# Patient Record
Sex: FEMALE | Race: BLACK OR AFRICAN AMERICAN | NOT HISPANIC OR LATINO | URBAN - METROPOLITAN AREA
[De-identification: names, ages, dates, MRNs, and addresses within clinical notes are randomized per-mention and may not be internally consistent; named-entity substitution may affect disease eponyms.]

---

## 2020-10-22 PROBLEM — Z12.11 SCREENING FOR COLONIC NEOPLASIA: Status: ACTIVE | Noted: 2020-10-22

## 2020-11-20 ENCOUNTER — OFFICE (OUTPATIENT)
Dept: URBAN - METROPOLITAN AREA CLINIC 34 | Facility: CLINIC | Age: 50
End: 2020-11-20
Payer: COMMERCIAL

## 2020-11-20 DIAGNOSIS — Z11.59 ENCOUNTER FOR SCREENING FOR OTHER VIRAL DISEASES: ICD-10-CM

## 2020-11-20 PROCEDURE — 99211 OFF/OP EST MAY X REQ PHY/QHP: CPT | Mod: 25,CS | Performed by: INTERNAL MEDICINE

## 2020-11-24 ENCOUNTER — OFFICE (OUTPATIENT)
Dept: URBAN - METROPOLITAN AREA CLINIC 30 | Facility: CLINIC | Age: 50
End: 2020-11-24
Payer: COMMERCIAL

## 2020-11-24 VITALS
RESPIRATION RATE: 18 BRPM | HEART RATE: 78 BPM | SYSTOLIC BLOOD PRESSURE: 101 MMHG | HEART RATE: 78 BPM | DIASTOLIC BLOOD PRESSURE: 35 MMHG | SYSTOLIC BLOOD PRESSURE: 138 MMHG | RESPIRATION RATE: 21 BRPM | OXYGEN SATURATION: 95 % | OXYGEN SATURATION: 99 % | WEIGHT: 230 LBS | WEIGHT: 230 LBS | DIASTOLIC BLOOD PRESSURE: 58 MMHG | HEART RATE: 101 BPM | SYSTOLIC BLOOD PRESSURE: 106 MMHG | SYSTOLIC BLOOD PRESSURE: 106 MMHG | SYSTOLIC BLOOD PRESSURE: 128 MMHG | HEART RATE: 81 BPM | SYSTOLIC BLOOD PRESSURE: 131 MMHG | TEMPERATURE: 98.1 F | OXYGEN SATURATION: 93 % | SYSTOLIC BLOOD PRESSURE: 97 MMHG | RESPIRATION RATE: 17 BRPM | HEART RATE: 79 BPM | RESPIRATION RATE: 18 BRPM | OXYGEN SATURATION: 93 % | SYSTOLIC BLOOD PRESSURE: 81 MMHG | SYSTOLIC BLOOD PRESSURE: 97 MMHG | RESPIRATION RATE: 16 BRPM | DIASTOLIC BLOOD PRESSURE: 69 MMHG | DIASTOLIC BLOOD PRESSURE: 35 MMHG | SYSTOLIC BLOOD PRESSURE: 128 MMHG | DIASTOLIC BLOOD PRESSURE: 80 MMHG | DIASTOLIC BLOOD PRESSURE: 74 MMHG | HEART RATE: 83 BPM | DIASTOLIC BLOOD PRESSURE: 80 MMHG | HEART RATE: 83 BPM | DIASTOLIC BLOOD PRESSURE: 69 MMHG | HEIGHT: 64 IN | DIASTOLIC BLOOD PRESSURE: 75 MMHG | DIASTOLIC BLOOD PRESSURE: 75 MMHG | RESPIRATION RATE: 20 BRPM | OXYGEN SATURATION: 99 % | RESPIRATION RATE: 21 BRPM | OXYGEN SATURATION: 95 % | HEART RATE: 101 BPM | HEIGHT: 64 IN | DIASTOLIC BLOOD PRESSURE: 86 MMHG | OXYGEN SATURATION: 96 % | SYSTOLIC BLOOD PRESSURE: 101 MMHG | SYSTOLIC BLOOD PRESSURE: 138 MMHG | SYSTOLIC BLOOD PRESSURE: 131 MMHG | TEMPERATURE: 98.1 F | DIASTOLIC BLOOD PRESSURE: 74 MMHG | HEART RATE: 81 BPM | DIASTOLIC BLOOD PRESSURE: 58 MMHG | RESPIRATION RATE: 20 BRPM | RESPIRATION RATE: 16 BRPM | DIASTOLIC BLOOD PRESSURE: 86 MMHG | HEART RATE: 79 BPM | OXYGEN SATURATION: 96 % | SYSTOLIC BLOOD PRESSURE: 81 MMHG | RESPIRATION RATE: 17 BRPM

## 2020-11-24 DIAGNOSIS — Z12.11 ENCOUNTER FOR SCREENING FOR MALIGNANT NEOPLASM OF COLON: ICD-10-CM

## 2020-11-24 DIAGNOSIS — K57.30 DIVERTICULOSIS OF LARGE INTESTINE WITHOUT PERFORATION OR ABS: ICD-10-CM

## 2020-11-24 DIAGNOSIS — K63.5 POLYP OF COLON: ICD-10-CM

## 2020-11-24 LAB
GI LOWER POLYPECTOMY EXCISION - SPECM 1 JAR(S): 1: (no result)
GI LOWER POLYPECTOMY EXCISION - SPECM 1 JAR(S): 1: (no result)
GI LOWER POLYPECTOMY EXCISION - SPECM 1 JAR(S): 1: PDF REPORT: (no result)
GI LOWER POLYPECTOMY EXCISION - SPECM 1 JAR(S): 1: PDF REPORT: (no result)

## 2020-11-24 PROCEDURE — 00812 ANES LWR INTST SCR COLSC: CPT | Mod: AA,P3,PT,QS

## 2020-11-24 PROCEDURE — 45380 COLONOSCOPY AND BIOPSY: CPT | Mod: PT | Performed by: INTERNAL MEDICINE

## 2020-11-24 PROCEDURE — 00812 ANES LWR INTST SCR COLSC: CPT | Mod: AA,QS,P3,PT

## 2021-12-28 ENCOUNTER — TELEHEALTH PROVIDED OTHER THAN IN PATIENT'S HOME (OUTPATIENT)
Dept: URBAN - METROPOLITAN AREA TELEHEALTH 12 | Facility: TELEHEALTH | Age: 51
End: 2021-12-28

## 2021-12-28 VITALS — WEIGHT: 230 LBS | HEIGHT: 64 IN

## 2021-12-28 DIAGNOSIS — K21.9 GASTRO-ESOPHAGEAL REFLUX DISEASE WITHOUT ESOPHAGITIS: ICD-10-CM

## 2021-12-28 PROCEDURE — 99214 OFFICE O/P EST MOD 30 MIN: CPT | Mod: 95 | Performed by: PHYSICIAN ASSISTANT

## 2021-12-28 NOTE — SERVICEHPINOTES
PATIENT VERIFIED BY DATE OF BIRTH AND NAME. Patient has been consented for this telecommunication visit.   Ms. Lan is here to discuss reflux present x 5 yrs. She takes Omeprazole which helps but not fully having to take adjunct medications which she takes on a frequent basis. Certain blood pressure medications have made her reflux worse over time. Had generalized abdominal pain present last week but this went away after switching her BP medication. No dysphagia, anorexia, early satiety, N/V, and no black stool. She takes daily Omeprazole and eats 30 minutes later. She knows what her trigger foods are, she tries to follow a low acid diet. No regular NSAID use.

## 2022-01-06 ENCOUNTER — OFFICE (OUTPATIENT)
Dept: URBAN - METROPOLITAN AREA CLINIC 30 | Facility: CLINIC | Age: 52
End: 2022-01-06

## 2022-01-06 VITALS
SYSTOLIC BLOOD PRESSURE: 135 MMHG | DIASTOLIC BLOOD PRESSURE: 127 MMHG | TEMPERATURE: 97.3 F | OXYGEN SATURATION: 100 % | OXYGEN SATURATION: 96 % | RESPIRATION RATE: 22 BRPM | HEART RATE: 76 BPM | SYSTOLIC BLOOD PRESSURE: 146 MMHG | HEIGHT: 63 IN | SYSTOLIC BLOOD PRESSURE: 152 MMHG | DIASTOLIC BLOOD PRESSURE: 93 MMHG | SYSTOLIC BLOOD PRESSURE: 126 MMHG | TEMPERATURE: 98.1 F | RESPIRATION RATE: 18 BRPM | SYSTOLIC BLOOD PRESSURE: 170 MMHG | HEART RATE: 72 BPM | OXYGEN SATURATION: 99 % | HEART RATE: 71 BPM | WEIGHT: 230 LBS | DIASTOLIC BLOOD PRESSURE: 84 MMHG | DIASTOLIC BLOOD PRESSURE: 83 MMHG | RESPIRATION RATE: 16 BRPM | HEART RATE: 87 BPM | DIASTOLIC BLOOD PRESSURE: 75 MMHG

## 2022-01-06 DIAGNOSIS — K22.89 OTHER SPECIFIED DISEASE OF ESOPHAGUS: ICD-10-CM

## 2022-01-06 DIAGNOSIS — K31.89 OTHER DISEASES OF STOMACH AND DUODENUM: ICD-10-CM

## 2022-01-06 DIAGNOSIS — K21.9 GASTRO-ESOPHAGEAL REFLUX DISEASE WITHOUT ESOPHAGITIS: ICD-10-CM

## 2022-01-06 DIAGNOSIS — K29.60 OTHER GASTRITIS WITHOUT BLEEDING: ICD-10-CM

## 2022-01-06 LAB
GI UPPER EGD HISOLOGY - SPECM 1: CLINICAL INFORMATION: (no result)
GI UPPER EGD HISOLOGY - SPECM 1: CPT CODES: (no result)
GI UPPER EGD HISOLOGY - SPECM 1: DIAGNOSIS: (no result)
GI UPPER EGD HISOLOGY - SPECM 1: GROSS DESCRIPTION: (no result)
GI UPPER EGD HISOLOGY - SPECM 1: INCOMING ICD CODE(S): (no result)
GI UPPER EGD HISOLOGY - SPECM 1: MICROSCOPIC DESCRIPTION: (no result)
GI UPPER EGD HISOLOGY - SPECM 1: PATHOLOGIST: (no result)
GI UPPER EGD HISOLOGY - SPECM 1: REPORT TITLE: (no result)
GI UPPER EGD HISOLOGY - SPECM 1: SPECIMEN SOURCE: (no result)
GI UPPER EGD HISOLOGY - SPECM 1: SYNOPSIS: (no result)
GI UPPER EGD HISOLOGY - SPECM 2: CLINICAL INFORMATION: (no result)
GI UPPER EGD HISOLOGY - SPECM 2: CPT CODES: (no result)
GI UPPER EGD HISOLOGY - SPECM 2: DIAGNOSIS: (no result)
GI UPPER EGD HISOLOGY - SPECM 2: GROSS DESCRIPTION: (no result)
GI UPPER EGD HISOLOGY - SPECM 2: INCOMING ICD CODE(S): (no result)
GI UPPER EGD HISOLOGY - SPECM 2: MICROSCOPIC DESCRIPTION: (no result)
GI UPPER EGD HISOLOGY - SPECM 2: PATHOLOGIST: (no result)
GI UPPER EGD HISOLOGY - SPECM 2: REPORT TITLE: (no result)
GI UPPER EGD HISOLOGY - SPECM 2: SPECIMEN SOURCE: (no result)
GI UPPER EGD HISOLOGY - SPECM 2: SYNOPSIS: (no result)
GI UPPER EGD HISOLOGY - SPECM 3: CLINICAL INFORMATION: (no result)
GI UPPER EGD HISOLOGY - SPECM 3: CPT CODES: (no result)
GI UPPER EGD HISOLOGY - SPECM 3: DIAGNOSIS: (no result)
GI UPPER EGD HISOLOGY - SPECM 3: GROSS DESCRIPTION: (no result)
GI UPPER EGD HISOLOGY - SPECM 3: INCOMING ICD CODE(S): (no result)
GI UPPER EGD HISOLOGY - SPECM 3: PATHOLOGIST: (no result)
GI UPPER EGD HISOLOGY - SPECM 3: REPORT TITLE: (no result)
GI UPPER EGD HISOLOGY - SPECM 3: SPECIMEN SOURCE: (no result)
GI UPPER EGD HISOLOGY - SPECM 3: SYNOPSIS: (no result)
PDF REPORT: (no result)

## 2022-01-06 PROCEDURE — 43239 EGD BIOPSY SINGLE/MULTIPLE: CPT | Performed by: INTERNAL MEDICINE

## 2022-01-06 RX ORDER — PANTOPRAZOLE SODIUM 40 MG/1
40 TABLET, DELAYED RELEASE ORAL
Qty: 30 | Refills: 5 | Status: COMPLETED
Start: 2022-01-06 | End: 2022-03-02

## 2022-03-02 ENCOUNTER — TELEHEALTH PROVIDED OTHER THAN IN PATIENT'S HOME (OUTPATIENT)
Dept: URBAN - METROPOLITAN AREA TELEHEALTH 3 | Facility: TELEHEALTH | Age: 52
End: 2022-03-02

## 2022-03-02 VITALS — HEIGHT: 63 IN | WEIGHT: 220 LBS

## 2022-03-02 DIAGNOSIS — K21.9 GASTRO-ESOPHAGEAL REFLUX DISEASE WITHOUT ESOPHAGITIS: ICD-10-CM

## 2022-03-02 PROCEDURE — 99213 OFFICE O/P EST LOW 20 MIN: CPT | Mod: 95 | Performed by: PHYSICIAN ASSISTANT

## 2022-03-02 NOTE — SERVICEHPINOTES
PATIENT VERIFIED BY DATE OF BIRTH AND NAME. Patient has been consented for this telecommunication visit.   Ms. Lan is here for f/u to EGD performed 01/2022 for GERD which showed mild erosive gastritis (normal esophageal biopsies and no h pylori found). Her symptoms include discomfort which is really dependent upon what she eats. She was supposed to start Protonix but never received it wants to try it. She has no other GI related complaints today. ROS as per HPI and otherwise is unremarkable.